# Patient Record
Sex: FEMALE | Race: WHITE | NOT HISPANIC OR LATINO | Employment: FULL TIME | URBAN - METROPOLITAN AREA
[De-identification: names, ages, dates, MRNs, and addresses within clinical notes are randomized per-mention and may not be internally consistent; named-entity substitution may affect disease eponyms.]

---

## 2017-05-11 ENCOUNTER — OFFICE VISIT (OUTPATIENT)
Dept: INTERNAL MEDICINE | Facility: CLINIC | Age: 37
End: 2017-05-11
Payer: COMMERCIAL

## 2017-05-11 ENCOUNTER — LAB VISIT (OUTPATIENT)
Dept: LAB | Facility: HOSPITAL | Age: 37
End: 2017-05-11
Attending: INTERNAL MEDICINE
Payer: COMMERCIAL

## 2017-05-11 VITALS
OXYGEN SATURATION: 98 % | HEIGHT: 62 IN | BODY MASS INDEX: 37.48 KG/M2 | WEIGHT: 203.69 LBS | SYSTOLIC BLOOD PRESSURE: 114 MMHG | DIASTOLIC BLOOD PRESSURE: 80 MMHG | HEART RATE: 81 BPM | TEMPERATURE: 99 F

## 2017-05-11 DIAGNOSIS — Z00.00 ROUTINE GENERAL MEDICAL EXAMINATION AT A HEALTH CARE FACILITY: ICD-10-CM

## 2017-05-11 DIAGNOSIS — J01.00 ACUTE NON-RECURRENT MAXILLARY SINUSITIS: ICD-10-CM

## 2017-05-11 DIAGNOSIS — F32.A DEPRESSION, UNSPECIFIED DEPRESSION TYPE: ICD-10-CM

## 2017-05-11 DIAGNOSIS — M32.9 SYSTEMIC LUPUS ERYTHEMATOSUS, UNSPECIFIED SLE TYPE, UNSPECIFIED ORGAN INVOLVEMENT STATUS: ICD-10-CM

## 2017-05-11 DIAGNOSIS — D56.3 THALASSEMIA MINOR: ICD-10-CM

## 2017-05-11 DIAGNOSIS — Z00.00 ROUTINE GENERAL MEDICAL EXAMINATION AT A HEALTH CARE FACILITY: Primary | ICD-10-CM

## 2017-05-11 LAB
ALBUMIN SERPL BCP-MCNC: 4.2 G/DL
ALP SERPL-CCNC: 77 U/L
ALT SERPL W/O P-5'-P-CCNC: 33 U/L
ANION GAP SERPL CALC-SCNC: 10 MMOL/L
ANISOCYTOSIS BLD QL SMEAR: SLIGHT
AST SERPL-CCNC: 22 U/L
BASOPHILS # BLD AUTO: 0.03 K/UL
BASOPHILS NFR BLD: 0.4 %
BILIRUB SERPL-MCNC: 0.6 MG/DL
BUN SERPL-MCNC: 14 MG/DL
CALCIUM SERPL-MCNC: 9.2 MG/DL
CHLORIDE SERPL-SCNC: 102 MMOL/L
CHOLEST/HDLC SERPL: 4 {RATIO}
CO2 SERPL-SCNC: 26 MMOL/L
CREAT SERPL-MCNC: 0.8 MG/DL
DIFFERENTIAL METHOD: ABNORMAL
EOSINOPHIL # BLD AUTO: 0.3 K/UL
EOSINOPHIL NFR BLD: 4 %
ERYTHROCYTE [DISTWIDTH] IN BLOOD BY AUTOMATED COUNT: 15.4 %
EST. GFR  (AFRICAN AMERICAN): >60 ML/MIN/1.73 M^2
EST. GFR  (NON AFRICAN AMERICAN): >60 ML/MIN/1.73 M^2
GLUCOSE SERPL-MCNC: 73 MG/DL
HCT VFR BLD AUTO: 30.4 %
HDL/CHOLESTEROL RATIO: 25.2 %
HDLC SERPL-MCNC: 139 MG/DL
HDLC SERPL-MCNC: 35 MG/DL
HGB BLD-MCNC: 9.6 G/DL
HYPOCHROMIA BLD QL SMEAR: ABNORMAL
LDLC SERPL CALC-MCNC: 87.4 MG/DL
LYMPHOCYTES # BLD AUTO: 2.6 K/UL
LYMPHOCYTES NFR BLD: 32.7 %
MCH RBC QN AUTO: 18.2 PG
MCHC RBC AUTO-ENTMCNC: 31.6 %
MCV RBC AUTO: 58 FL
MONOCYTES # BLD AUTO: 0.6 K/UL
MONOCYTES NFR BLD: 7.9 %
NEUTROPHILS # BLD AUTO: 4.3 K/UL
NEUTROPHILS NFR BLD: 55 %
NONHDLC SERPL-MCNC: 104 MG/DL
OVALOCYTES BLD QL SMEAR: ABNORMAL
PLATELET # BLD AUTO: 314 K/UL
PLATELET BLD QL SMEAR: ABNORMAL
PMV BLD AUTO: ABNORMAL FL
POIKILOCYTOSIS BLD QL SMEAR: SLIGHT
POLYCHROMASIA BLD QL SMEAR: ABNORMAL
POTASSIUM SERPL-SCNC: 3.9 MMOL/L
PROT SERPL-MCNC: 7.6 G/DL
RBC # BLD AUTO: 5.27 M/UL
SCHISTOCYTES BLD QL SMEAR: PRESENT
SODIUM SERPL-SCNC: 138 MMOL/L
SPHEROCYTES BLD QL SMEAR: ABNORMAL
TRIGL SERPL-MCNC: 83 MG/DL
TSH SERPL DL<=0.005 MIU/L-ACNC: 1.86 UIU/ML
WBC # BLD AUTO: 7.82 K/UL

## 2017-05-11 PROCEDURE — 84443 ASSAY THYROID STIM HORMONE: CPT

## 2017-05-11 PROCEDURE — 96372 THER/PROPH/DIAG INJ SC/IM: CPT | Mod: S$GLB,,, | Performed by: INTERNAL MEDICINE

## 2017-05-11 PROCEDURE — 99385 PREV VISIT NEW AGE 18-39: CPT | Mod: 25,S$GLB,, | Performed by: INTERNAL MEDICINE

## 2017-05-11 PROCEDURE — 85025 COMPLETE CBC W/AUTO DIFF WBC: CPT

## 2017-05-11 PROCEDURE — 36415 COLL VENOUS BLD VENIPUNCTURE: CPT | Mod: PO

## 2017-05-11 PROCEDURE — 99999 PR PBB SHADOW E&M-NEW PATIENT-LVL III: CPT | Mod: PBBFAC,,, | Performed by: INTERNAL MEDICINE

## 2017-05-11 PROCEDURE — 80061 LIPID PANEL: CPT

## 2017-05-11 PROCEDURE — 80053 COMPREHEN METABOLIC PANEL: CPT

## 2017-05-11 RX ORDER — DULOXETIN HYDROCHLORIDE 60 MG/1
60 CAPSULE, DELAYED RELEASE ORAL DAILY
Qty: 30 CAPSULE | Refills: 11 | Status: SHIPPED | OUTPATIENT
Start: 2017-05-11 | End: 2017-10-31

## 2017-05-11 RX ORDER — HYDROXYCHLOROQUINE SULFATE 200 MG/1
200 TABLET, FILM COATED ORAL DAILY
COMMUNITY
End: 2017-10-31

## 2017-05-11 RX ORDER — DOXYCYCLINE 100 MG/1
100 CAPSULE ORAL 2 TIMES DAILY
Qty: 20 CAPSULE | Refills: 0 | Status: SHIPPED | OUTPATIENT
Start: 2017-05-11 | End: 2017-08-28

## 2017-05-11 RX ORDER — DULOXETIN HYDROCHLORIDE 60 MG/1
60 CAPSULE, DELAYED RELEASE ORAL DAILY
COMMUNITY
End: 2017-05-11 | Stop reason: SDUPTHER

## 2017-05-11 RX ORDER — MULTIVITAMIN
1 TABLET ORAL DAILY
COMMUNITY

## 2017-05-11 RX ORDER — METHYLPREDNISOLONE ACETATE 80 MG/ML
80 INJECTION, SUSPENSION INTRA-ARTICULAR; INTRALESIONAL; INTRAMUSCULAR; SOFT TISSUE
Status: COMPLETED | OUTPATIENT
Start: 2017-05-11 | End: 2017-05-11

## 2017-05-11 RX ADMIN — METHYLPREDNISOLONE ACETATE 80 MG: 80 INJECTION, SUSPENSION INTRA-ARTICULAR; INTRALESIONAL; INTRAMUSCULAR; SOFT TISSUE at 08:05

## 2017-05-11 NOTE — MR AVS SNAPSHOT
[place] [facility]  [address] [facility] [id number]  Phone: [phone]                  Eugenia Sultana   2017 7:40 AM   Office Visit    Description:  Female : 1980   Provider:  Eriberto Pedraza MD   Department:  [facility]           Reason for Visit     Establish Care     Sinusitis           Diagnoses this Visit        Comments    Routine general medical examination at a health care facility    -  Primary     Systemic lupus erythematosus, unspecified SLE type, unspecified organ involvement status         Thalassemia minor                To Do List           Future Appointments        Provider Department Dept Phone    2017 10:50 AM LABORATORY, [facility] - Laboratory [phone]      Goals (5 Years of Data)     None       These Medications        Disp Refills Start End    doxycycline (VIBRAMYCIN) 100 MG Cap 20 capsule 0 2017     Take 1 capsule (100 mg total) by mouth 2 (two) times daily. - Oral    Pharmacy: [facility]/pharmacy #[id number] [facility] [address] [facility] Ph #: [phone]       duloxetine (CYMBALTA) 60 MG capsule 30 capsule 11 2017     Take 1 capsule (60 mg total) by mouth once daily. - Oral    Pharmacy: [facility]/pharmacy #[id number] [facility] [address] [address] [facility] Ph #: [phone]         [facility]     [facility] - 24/ Assistance  Unless otherwise directed by your provider, please contact [facility], our nurse care line that is available for 24/7 assistance.     Registered nurses in the [facility] provide: appointment scheduling, clinical advisement, health education, and other advisory services.  Call: [phone] (toll free)               Medications           Message regarding Medications     Verify the changes and/or additions to your medication regime listed below are the same as discussed with your clinician today.  If any of these changes or additions are incorrect,  "please notify your healthcare provider.        START taking these NEW medications        Refills    doxycycline (VIBRAMYCIN) 100 MG Cap 0    Sig: Take 1 capsule (100 mg total) by mouth 2 (two) times daily.    Class: Normal    Route: Oral    duloxetine (CYMBALTA) 60 MG capsule 11    Sig: Take 1 capsule (60 mg total) by mouth once daily.    Class: Normal    Route: Oral      These medications were administered today        Dose Freq    methylPREDNISolone acetate injection 80 mg 80 mg Clinic/HOD 1 time    Sig: Inject 1 mL (80 mg total) into the muscle one time.    Class: Normal    Route: Intramuscular           Verify that the below list of medications is an accurate representation of the medications you are currently taking.  If none reported, the list may be blank. If incorrect, please contact your healthcare provider. Carry this list with you in case of emergency.           Current Medications     duloxetine (CYMBALTA) 60 MG capsule Take 1 capsule (60 mg total) by mouth once daily.    hydroxychloroquine (PLAQUENIL) 200 mg tablet Take 200 mg by mouth once daily.    multivitamin (ONE DAILY MULTIVITAMIN) per tablet Take 1 tablet by mouth once daily.    doxycycline (VIBRAMYCIN) 100 MG Cap Take 1 capsule (100 mg total) by mouth 2 (two) times daily.           Clinical Reference Information           Your Vitals Were     BP Pulse Temp Height Weight SpO2    114/80 81 98.9 °F (37.2 °C) (Tympanic) 5' 2" (1.575 m) 92.4 kg (203 lb 11.3 oz) 98%    BMI                37.26 kg/m2          Blood Pressure          Most Recent Value    BP  114/80      Allergies as of 5/11/2017     Augmentin [Amoxicillin-pot Clavulanate]    Cinnamon Analogues      Immunizations Administered on Date of Encounter - 5/11/2017     None      Orders Placed During Today's Visit     Future Labs/Procedures Expected by Expires    CBC auto differential  5/11/2017 7/10/2018    Comprehensive metabolic panel  5/11/2017 7/10/2018    Lipid panel  5/11/2017 7/10/2018 "    TSH  5/11/2017 7/10/2018      Administrations This Visit     methylPREDNISolone acetate injection 80 mg     Admin Date Action Dose Route Administered By             05/11/2017 Given 80 mg Intramuscular Nancy Orellana LPN                      [facility] Sign-Up     Activating your [facility] account is as easy as 1-2-3!     1) Visit [email or web address], select Sign Up Now, enter this activation code and your date of birth, then select Next.  [email or web address]  Expires: 6/24/2017 12:51 PM      2) Create a username and password to use when you visit [facility] in the future and select a security question in case you lose your password and select Next.    3) Enter your e-mail address and click Sign Up!    Additional Information  If you have questions, please e-mail [email or web address] or call [phone] to talk to our [facility] staff. Remember, [facility] is NOT to be used for urgent needs. For medical emergencies, dial 911.         Smoking Cessation     If you would like to quit smoking:   You may be eligible for free services if you are a [place] resident and started smoking cigarettes before September 1, 1988.  Call the Smoking Cessation Trust ([facility]) toll free at [phone] or [phone].   Call 4-215-QUIT-NOW if you do not meet the above criteria.   Contact us via email: [email or web address]   View our website for more information: [email or web address]        Language Assistance Services     ATTENTION: Language assistance services are available, free of charge. Please call [phone].      ATENCIÓN: Si habla español, tiene a hodgson disposición servicios gratuitos de asistencia lingüística. Llame al [phone].     CHÚ Ý: N?u b?n nói Ti?ng Vi?t, có các d?ch v? h? tr? ngôn ng? mi?n phí dành cho b?n. G?i s? [phone].         [facility] [facility] complies with applicable Federal civil rights laws and does not discriminate on the basis of race, color, national origin, age,  disability, or sex.

## 2017-05-11 NOTE — PROGRESS NOTES
"HPI:  Patient is a 36-year-old female who comes in today for initial visit myself to establish care.  Patient at this time has problems with sinusitis.  She has 1-2 episodes per year.  Currently she's been having problems for about a week.  She complains a lot of maxillary and frontal sinus tenderness.  She has yellow greenish discharge.  She has a lot of pressure in ears as well.  Otherwise, she is been doing fine.  She has a history of lupus and has been on Plaquenil for about one year.  That is controlled most of her symptoms.  She has a long history of depression, having been on medications for 15-16 years    Current MEDS: medcard review, verified and update  Allergies: Per the electronic medical record    Past Medical History:   Diagnosis Date    Depression     SLE (systemic lupus erythematosus) 2016    Thalassemia minor     not sure if alpha/beta       Past Surgical History:   Procedure Laterality Date    TONSILLECTOMY         SHx: per the electronic medical record    FHx: recorded in the electronic medical record    ROS:    denies any chest pains or shortness of breath. Denies any nausea, vomiting or diarrhea. Denies any fever, chills or sweats. Denies any change in weight, voice, stool, skin or hair. Denies any dysuria, dyspepsia or dysphagia. Denies any change in vision, hearing or headaches. Denies any swollen lymph nodes or loss of memory.    PE:  /80  Pulse 81  Temp 98.9 °F (37.2 °C) (Tympanic)   Ht 5' 2" (1.575 m)  Wt 92.4 kg (203 lb 11.3 oz)  SpO2 98%  BMI 37.26 kg/m2  Gen: Well-developed, well-nourished, female, in no acute distress, oriented x3  HEENT: neck is supple, no adenopathy, carotids 2+ equal without bruits, thyroid exam normal size without nodules.  Nasal mucosa hyperemic and edematous.  TMs are normal.  She has both frontal and maxillary sinus tenderness to palpation  CHEST: clear to auscultation and percussion  CVS: regular rate and rhythm without significant murmur, " gallop, or rubs  ABD: soft, benign, no rebound no guarding, no distention. Bowel sounds are normal.     Nontender,  no palpable masses, no organomegaly and no audible bruits.  BREAST: no masses.  No nipple inversion, retraction, or deviation.  EXT: no clubbing, cyanosis, or edema  LYMPH: no cervical, inguinal, or axillary adenopathy  FEET: no loss of sensation.  No ulcers or pressure sores.  NEURO: gait normal.  Cranial nerves II- XII intact. No nystagmus.  Speech normal.   Gross motor and sensory unremarkable.  PELVIC: deferred        Impression:  Acute sinusitis  Other problems below, stable  Patient Active Problem List   Diagnosis    SLE (systemic lupus erythematosus)    Thalassemia minor    Depression       Plan:   Orders Placed This Encounter    CBC auto differential    Comprehensive metabolic panel    Lipid panel    TSH    doxycycline (VIBRAMYCIN) 100 MG Cap    duloxetine (CYMBALTA) 60 MG capsule    methylPREDNISolone acetate injection 80 mg     She was given Depo-Medrol 80×1.  She was started on doxycycline 10 days.  She will take over-the-counter Allegra-D and Mucinex.  She'll have the above lab work done.  We will call her with results.

## 2017-08-28 ENCOUNTER — OFFICE VISIT (OUTPATIENT)
Dept: INTERNAL MEDICINE | Facility: CLINIC | Age: 37
End: 2017-08-28
Payer: OTHER GOVERNMENT

## 2017-08-28 VITALS
BODY MASS INDEX: 35.94 KG/M2 | SYSTOLIC BLOOD PRESSURE: 134 MMHG | DIASTOLIC BLOOD PRESSURE: 80 MMHG | HEIGHT: 62 IN | OXYGEN SATURATION: 98 % | WEIGHT: 195.31 LBS | HEART RATE: 83 BPM | TEMPERATURE: 98 F

## 2017-08-28 DIAGNOSIS — R50.9 FEVER, UNSPECIFIED FEVER CAUSE: Primary | ICD-10-CM

## 2017-08-28 PROCEDURE — 99999 PR PBB SHADOW E&M-EST. PATIENT-LVL III: CPT | Mod: PBBFAC,,, | Performed by: NURSE PRACTITIONER

## 2017-08-28 PROCEDURE — 3008F BODY MASS INDEX DOCD: CPT | Mod: ,,, | Performed by: NURSE PRACTITIONER

## 2017-08-28 PROCEDURE — 99213 OFFICE O/P EST LOW 20 MIN: CPT | Mod: PBBFAC,PO | Performed by: NURSE PRACTITIONER

## 2017-08-28 PROCEDURE — 99213 OFFICE O/P EST LOW 20 MIN: CPT | Mod: S$PBB,,, | Performed by: NURSE PRACTITIONER

## 2017-08-28 NOTE — PROGRESS NOTES
"Subjective:      Patient ID: Eugenia Tobin is a 36 y.o. female.    Chief Complaint: Fever    HPI:   Patient states for several weeks now she says she has had a low grade temp of 99.  Today she had a temp of 102.2 this am.  She says she feels ok, denies cough, congestion, sob, urinary problems, body aches, or joint pain.  She says she is scheduled to see her rheumatologist today.  She has seen Dr Churchill before, thinks she will have labs done, she says they are working her up for lupus or some other disease.      Past Medical History:   Diagnosis Date    Depression     SLE (systemic lupus erythematosus) 2016    Thalassemia minor     not sure if alpha/beta       Past Surgical History:   Procedure Laterality Date    TONSILLECTOMY         Lab Results   Component Value Date    WBC 7.82 05/11/2017    HGB 9.6 (L) 05/11/2017    HCT 30.4 (L) 05/11/2017     05/11/2017    CHOL 139 05/11/2017    TRIG 83 05/11/2017    HDL 35 (L) 05/11/2017    ALT 33 05/11/2017    AST 22 05/11/2017     05/11/2017    K 3.9 05/11/2017     05/11/2017    CREATININE 0.8 05/11/2017    BUN 14 05/11/2017    CO2 26 05/11/2017    TSH 1.864 05/11/2017       /80   Pulse 83   Temp 98.4 °F (36.9 °C) (Tympanic)   Ht 5' 2" (1.575 m)   Wt 88.6 kg (195 lb 5.2 oz)   SpO2 98%   BMI 35.73 kg/m²       Review of Systems   Constitutional: Negative for appetite change, fatigue and unexpected weight change.   HENT: Negative for congestion, ear pain, postnasal drip, rhinorrhea, sinus pressure, sneezing, sore throat, tinnitus, trouble swallowing and voice change.    Eyes: Negative for pain, discharge, redness, itching and visual disturbance.   Respiratory: Negative for cough, chest tightness, shortness of breath and wheezing.    Cardiovascular: Negative for chest pain, palpitations and leg swelling.   Gastrointestinal: Negative for abdominal distention, abdominal pain, blood in stool, constipation, diarrhea, nausea and vomiting.     "    No reflux.   Genitourinary: Negative for difficulty urinating, dyspareunia, flank pain, menstrual problem and pelvic pain.   Musculoskeletal: Negative for arthralgias, back pain, myalgias and neck stiffness.   Skin: Negative for color change and rash.   Neurological: Negative for dizziness and headaches.   Psychiatric/Behavioral: Negative for confusion and sleep disturbance. The patient is not nervous/anxious.       Objective:     Physical Exam   Constitutional: She is oriented to person, place, and time. She appears well-developed and well-nourished. No distress.   Musculoskeletal:   Normal gait   Neurological: She is alert and oriented to person, place, and time.   Skin: Skin is warm and dry.   Psychiatric: She has a normal mood and affect. Her behavior is normal.     Assessment:      1. Fever, unspecified fever cause      Plan:   Fever, unspecified fever cause    She will see her rheumatologist and send the reports to her pcp, dr Pedraza.        Current Outpatient Prescriptions:     duloxetine (CYMBALTA) 60 MG capsule, Take 1 capsule (60 mg total) by mouth once daily., Disp: 30 capsule, Rfl: 11    hydroxychloroquine (PLAQUENIL) 200 mg tablet, Take 200 mg by mouth once daily., Disp: , Rfl:     multivitamin (ONE DAILY MULTIVITAMIN) per tablet, Take 1 tablet by mouth once daily., Disp: , Rfl:

## 2017-10-31 ENCOUNTER — OFFICE VISIT (OUTPATIENT)
Dept: INTERNAL MEDICINE | Facility: CLINIC | Age: 37
End: 2017-10-31
Payer: OTHER GOVERNMENT

## 2017-10-31 ENCOUNTER — TELEPHONE (OUTPATIENT)
Dept: INTERNAL MEDICINE | Facility: CLINIC | Age: 37
End: 2017-10-31

## 2017-10-31 VITALS
TEMPERATURE: 98 F | HEIGHT: 62 IN | BODY MASS INDEX: 36.63 KG/M2 | HEART RATE: 86 BPM | OXYGEN SATURATION: 99 % | DIASTOLIC BLOOD PRESSURE: 84 MMHG | SYSTOLIC BLOOD PRESSURE: 122 MMHG | WEIGHT: 199.06 LBS

## 2017-10-31 DIAGNOSIS — M25.50 ARTHRALGIA, UNSPECIFIED JOINT: ICD-10-CM

## 2017-10-31 DIAGNOSIS — L93.0 LUPUS ERYTHEMATOSUS, UNSPECIFIED FORM: Primary | ICD-10-CM

## 2017-10-31 DIAGNOSIS — R76.8 POSITIVE ANA (ANTINUCLEAR ANTIBODY): ICD-10-CM

## 2017-10-31 PROCEDURE — 99999 PR PBB SHADOW E&M-EST. PATIENT-LVL IV: CPT | Mod: PBBFAC,,, | Performed by: NURSE PRACTITIONER

## 2017-10-31 PROCEDURE — 99213 OFFICE O/P EST LOW 20 MIN: CPT | Mod: S$PBB,,, | Performed by: NURSE PRACTITIONER

## 2017-10-31 PROCEDURE — 99214 OFFICE O/P EST MOD 30 MIN: CPT | Mod: PBBFAC,PO | Performed by: NURSE PRACTITIONER

## 2017-10-31 RX ORDER — DULOXETIN HYDROCHLORIDE 60 MG/1
60 CAPSULE, DELAYED RELEASE ORAL DAILY
COMMUNITY
End: 2019-09-23

## 2017-10-31 NOTE — PROGRESS NOTES
"Subjective:      Patient ID: Eugenia Tobin is a 37 y.o. female.    Chief Complaint: pain in joints    HPI:  Patient states she is having a lot of problems with joint pain.  She says all of her joints are hurting in her body.  She sees Dr Lr, a rheumatologist who is treating her for fibromyalgia, saw him last week. She has been taken off of her Plaquenil and steroids.  She says she had been prescribed 5 mg of prednisone daily for a month, she is not taking it.  Says he wants to do a sleep study, she is not interested in that.  She is looking for a referral to another rheumatolotist    Past Medical History:   Diagnosis Date    Depression     SLE (systemic lupus erythematosus) 2016    Thalassemia minor     not sure if alpha/beta       Past Surgical History:   Procedure Laterality Date    TONSILLECTOMY         Lab Results   Component Value Date    WBC 7.82 05/11/2017    HGB 9.6 (L) 05/11/2017    HCT 30.4 (L) 05/11/2017     05/11/2017    CHOL 139 05/11/2017    TRIG 83 05/11/2017    HDL 35 (L) 05/11/2017    ALT 33 05/11/2017    AST 22 05/11/2017     05/11/2017    K 3.9 05/11/2017     05/11/2017    CREATININE 0.8 05/11/2017    BUN 14 05/11/2017    CO2 26 05/11/2017    TSH 1.864 05/11/2017       /84 (BP Location: Right arm)   Pulse 86   Temp 98.1 °F (36.7 °C) (Tympanic)   Ht 5' 2" (1.575 m)   Wt 90.3 kg (199 lb 1.2 oz)   SpO2 99%   BMI 36.41 kg/m²       Review of Systems   Constitutional: Negative for appetite change, fatigue and unexpected weight change.   HENT: Negative for congestion, ear pain, postnasal drip, rhinorrhea, sinus pressure, sneezing, sore throat, tinnitus, trouble swallowing and voice change.    Eyes: Negative for pain, discharge, redness, itching and visual disturbance.   Respiratory: Negative for cough, chest tightness, shortness of breath and wheezing.    Cardiovascular: Negative for chest pain, palpitations and leg swelling.   Gastrointestinal: Negative for " abdominal distention, abdominal pain, blood in stool, constipation, diarrhea, nausea and vomiting.        No reflux.   Genitourinary: Negative for difficulty urinating, dyspareunia, flank pain, menstrual problem and pelvic pain.   Musculoskeletal: Positive for arthralgias. Negative for back pain, myalgias and neck stiffness.   Skin: Negative for color change and rash.   Neurological: Negative for dizziness and headaches.   Psychiatric/Behavioral: Negative for confusion and sleep disturbance. The patient is not nervous/anxious.       Objective:     Physical Exam   Constitutional: She is oriented to person, place, and time. She appears well-developed and well-nourished. No distress.   Musculoskeletal:   Normal gait   Neurological: She is alert and oriented to person, place, and time.   Skin: Skin is warm and dry.   Psychiatric: She has a normal mood and affect. Her behavior is normal.     Assessment:      1. Lupus erythematosus, unspecified form    2. Arthralgia, unspecified joint    3. Positive GE (antinuclear antibody)      Plan:   Lupus erythematosus, unspecified form  -     Cancel: Ambulatory Referral to Rheumatology  -     Ambulatory Referral to Rheumatology    Arthralgia, unspecified joint  -     Cancel: Ambulatory Referral to Rheumatology  -     Ambulatory Referral to Rheumatology    Positive GE (antinuclear antibody)  -     Ambulatory Referral to Rheumatology    will check labs.  May be a while before she can be seen with our rheumatology dept.  Monitoring for the results.  Needs to see her current one in the mean time for any issues      Current Outpatient Prescriptions:     DULoxetine (CYMBALTA) 60 MG capsule, Take 60 mg by mouth once daily., Disp: , Rfl:     multivitamin (ONE DAILY MULTIVITAMIN) per tablet, Take 1 tablet by mouth once daily., Disp: , Rfl:

## 2017-10-31 NOTE — TELEPHONE ENCOUNTER
Please call her to let her know I discussed her case with dr fowler, her PCP.   I need her to come in for labs.  We need to see if her inflammatory markers are elevated

## 2017-11-01 ENCOUNTER — LAB VISIT (OUTPATIENT)
Dept: LAB | Facility: HOSPITAL | Age: 37
End: 2017-11-01
Attending: INTERNAL MEDICINE
Payer: OTHER GOVERNMENT

## 2017-11-01 ENCOUNTER — TELEPHONE (OUTPATIENT)
Dept: RHEUMATOLOGY | Facility: CLINIC | Age: 37
End: 2017-11-01

## 2017-11-01 DIAGNOSIS — L93.0 LUPUS ERYTHEMATOSUS, UNSPECIFIED FORM: ICD-10-CM

## 2017-11-01 LAB
CRP SERPL-MCNC: 1 MG/L
ERYTHROCYTE [SEDIMENTATION RATE] IN BLOOD BY WESTERGREN METHOD: 4 MM/HR

## 2017-11-01 PROCEDURE — 86140 C-REACTIVE PROTEIN: CPT

## 2017-11-01 PROCEDURE — 85651 RBC SED RATE NONAUTOMATED: CPT

## 2017-11-01 PROCEDURE — 86038 ANTINUCLEAR ANTIBODIES: CPT

## 2017-11-01 PROCEDURE — 36415 COLL VENOUS BLD VENIPUNCTURE: CPT | Mod: PO

## 2017-11-02 ENCOUNTER — TELEPHONE (OUTPATIENT)
Dept: INTERNAL MEDICINE | Facility: CLINIC | Age: 37
End: 2017-11-02

## 2017-11-02 DIAGNOSIS — M79.7 FIBROMYALGIA: Primary | ICD-10-CM

## 2017-11-02 LAB — ANA SER QL IF: NORMAL

## 2017-11-02 NOTE — TELEPHONE ENCOUNTER
Called pt informed her of recommendations.  She voiced understanding and has an appointment scheduled.  She reports that she is not taking Neurontin.  She is taking 20 mg of Prednisone.

## 2017-11-02 NOTE — TELEPHONE ENCOUNTER
Please let her know her labs are not showing any urgency to see our rheumatologist.  We can schedule her to see one of our rheumatologist next available, their is a waiting list.  Please see your current rheumatologist while waiting    The referral is in       Please note from Care everywhere from Dr Pittman's notes she is supposed to be on 5 mg of Prednisone daily, and Neruontin at night.  She can call his office to clarify this.    thanks

## 2017-11-03 ENCOUNTER — TELEPHONE (OUTPATIENT)
Dept: INTERNAL MEDICINE | Facility: CLINIC | Age: 37
End: 2017-11-03

## 2017-11-03 NOTE — TELEPHONE ENCOUNTER
Spoke to her and discussed her lab results.  We discussed Dr Pittman's note and recommendations.  She may try the Neurontin nightly again, 100 mg and slowly wean up as discussed.  Will see our rheumatolgist in 2 mo

## 2018-02-26 ENCOUNTER — TELEPHONE (OUTPATIENT)
Dept: RHEUMATOLOGY | Facility: CLINIC | Age: 38
End: 2018-02-26

## 2018-02-26 NOTE — TELEPHONE ENCOUNTER
----- Message from Tahira Morataya sent at 2/26/2018 11:51 AM CST -----  Contact: Self  Pt is calling to schedule an appt to see Dr. Gracia for a 2nd opinion for Lupus to try and find out if she has it or not.   was unable to make the appt due to an exhausted template.    She can be reached at [phone].    Thank you.

## 2018-02-26 NOTE — TELEPHONE ENCOUNTER
Spoke with Ms. Tobin and scheduled a new patient appointment with Dr. Gracia for 02/28/2018 at 8:30am for Lupus second opinion.

## 2018-02-28 ENCOUNTER — OFFICE VISIT (OUTPATIENT)
Dept: RHEUMATOLOGY | Facility: CLINIC | Age: 38
End: 2018-02-28
Payer: OTHER GOVERNMENT

## 2018-02-28 ENCOUNTER — HOSPITAL ENCOUNTER (OUTPATIENT)
Dept: RADIOLOGY | Facility: HOSPITAL | Age: 38
Discharge: HOME | End: 2018-02-28
Attending: INTERNAL MEDICINE
Payer: OTHER GOVERNMENT

## 2018-02-28 VITALS
HEART RATE: 100 BPM | HEIGHT: 62 IN | SYSTOLIC BLOOD PRESSURE: 142 MMHG | DIASTOLIC BLOOD PRESSURE: 82 MMHG | WEIGHT: 200.38 LBS | BODY MASS INDEX: 36.87 KG/M2

## 2018-02-28 DIAGNOSIS — M79.7 FIBROMYALGIA: Primary | ICD-10-CM

## 2018-02-28 PROCEDURE — 99214 OFFICE O/P EST MOD 30 MIN: CPT | Mod: PBBFAC,25,PO | Performed by: INTERNAL MEDICINE

## 2018-02-28 PROCEDURE — 73130 X-RAY EXAM OF HAND: CPT | Mod: 26,50,, | Performed by: RADIOLOGY

## 2018-02-28 PROCEDURE — 73130 X-RAY EXAM OF HAND: CPT | Mod: 50,TC,FY,PO

## 2018-02-28 PROCEDURE — 99204 OFFICE O/P NEW MOD 45 MIN: CPT | Mod: S$PBB,,, | Performed by: INTERNAL MEDICINE

## 2018-02-28 PROCEDURE — 99999 PR PBB SHADOW E&M-EST. PATIENT-LVL IV: CPT | Mod: PBBFAC,,, | Performed by: INTERNAL MEDICINE

## 2018-02-28 RX ORDER — MULTIVITAMIN
TABLET ORAL
COMMUNITY
End: 2018-02-28 | Stop reason: SDUPTHER

## 2018-02-28 RX ORDER — GABAPENTIN 100 MG/1
200 CAPSULE ORAL DAILY PRN
COMMUNITY
End: 2018-03-08 | Stop reason: ALTCHOICE

## 2018-02-28 RX ORDER — DULOXETIN HYDROCHLORIDE 60 MG/1
60 CAPSULE, DELAYED RELEASE ORAL DAILY
COMMUNITY
Start: 2017-11-17 | End: 2019-09-23

## 2018-02-28 NOTE — PROGRESS NOTES
CC:  Chief Complaint   Patient presents with    Positive GE       History of Present Illness:  Eugenia Berry a 37 y.o.yo female   Patient Active Problem List   Diagnosis    SLE (systemic lupus erythematosus)    Thalassemia minor    Depression    Fibromyalgia     Presents for new patient evaluation for + GE in the past and second opinion to r/o lupus   In 2016 she had a borderline + ve GE , with neg panel and - ve RF   She was seen by Dr Pineda who treated her for lupus after extensive evaluation was negative for any CTD   She presents for a second opinion .  She c/o malar redness , + PS , no other rashes , + chronic fatigue   Denies dry eyes , mouth , oral ulcers   She has had chronic  arthralgias for many years , inv hands , knees , hips   Feels hands swell up and she also notices some tingling intermittently   With weak hand    She has no specific pattern of early am stiffness , some times pain / stiffness all day   Ibuprofen helps some   No RP   In the past she has tried plaquenil x 5 months without much help and so stopped  Prednisone 20 mg gave her symptomatic relief   She takes cymbalta 60 for depression   She takes gabapentin 200 mg qhs prn for restless legs   + smoking   + f/h of lupus in sister who      Past Medical History:   Diagnosis Date    Depression     SLE (systemic lupus erythematosus) 2016    Thalassemia minor     not sure if alpha/beta       Past Surgical History:   Procedure Laterality Date    TONSILLECTOMY           Social History   Substance Use Topics    Smoking status: Current Every Day Smoker    Smokeless tobacco: Not on file    Alcohol use No       Family History   Problem Relation Age of Onset    Diabetes Mother     Diabetes Father     Lupus Sister     Alzheimer's disease Maternal Grandmother     Thalassemia Paternal Grandfather        Review of patient's allergies indicates:   Allergen Reactions    Augmentin [amoxicillin-pot clavulanate]      Cinnamon analogues              Review of Systems:  Constitutional: Denies fever, chills. No recent weight changes.   Fatigue: yes  Muscle weakness: no  Headaches: no new headaches  Eyes: No redness or dryness.  No recent visual changes.  ENT: Denies dry mouth. No oral or nasal ulcers.  Card: No chest pain.  Resp: No cough or sob.   Gastro: No nausea or vomiting.  No heartburn.  Constipation: no  Diarrhea: no  Genito:  No dysuria.  No genital ulcers.  Skin: No rash.  Raynauds:no  Neuro: per HPI   Psych: + depression  Endo:  no excess thirst.  Heme: no abnormal bleeding or bruising  Clots:none   Miscarriages : none     OBJECTIVE:     Vital Signs   Vitals:    02/28/18 0832   BP: (!) 142/82   Pulse: 100     Physical Exam:  General Appearance:  NAD.   Gait: not favoring.  HEENT: PERRL.  Eyes not dry or injected.  No nasal ulcers.  Mouth not dry, no oral lesions.  Lymph: cervical, supraclavicular or axillary nodes: none abnormal   Cardio: no irregularity of S1 or S2.  No gallops or rubs.   Resp: Normal respiratory motion. Clear to auscultation bilaterally.   No abnormal chest conformation.  Abd: Soft, non-tender, nondistended.  No masses.   Skin: Head and neck,  and extremities examined. No rashes.   Neuro: Ox3.   Cranial nerves II-XII grossly intact.   Sensation intact  in both distal LE and upper extremities to light touch.  Musculoskeletal Exam:  She has tenderness to palpation of anterior  chest wall, all PIP/ MCP     Right Side Rheumatological Exam     Examination finds the shoulder, elbow, wrist, knee, 1st PIP, 1st MCP, 2nd PIP, 2nd MCP, 3rd PIP, 3rd MCP, 4th PIP, 4th MCP, 5th PIP and 5th MCP no synovitis .    Others :  Hip:N  Ankle :N  Foot:N     Left Side Rheumatological Exam     Examination finds the shoulder, elbow, wrist, knee, 1st PIP, 1st MCP, 2nd PIP, 2nd MCP, 3rd PIP, 3rd MCP, 4th PIP, 4th MCP, 5th PIP and 5th MCP no synovitis     Others :  Hip:N  Ankle :N  Foot:N    Spine :  Occiput to wall :neg    Modified schobers :neg     Tender points:+  Muscle strength:Equal and full in all mm groups of the upper and lower ext.    Laboratory:   Results for orders placed or performed in visit on 11/01/17   C-reactive protein   Result Value Ref Range    CRP 1.0 0.0 - 8.2 mg/L   GE   Result Value Ref Range    GE Screen Negative <1:160 Negative <1:160   Sedimentation rate, manual   Result Value Ref Range    Sed Rate 4 0 - 20 mm/Hr     Imaging :none     Notes reviewed  Other procedures:    ASSESSMENT/PLAN:     Fibromyalgia  -     Cyclic citrul peptide antibody, IgG; Future; Expected date: 02/28/2018  -     X-Ray Hand 3 View Bilateral; Future; Expected date: 02/28/2018  -     Ambulatory Referral to Physical/Occupational Therapy      1: Polyarthralgia with h/o borderline + GE   With negative panel and normal complements   Rf- neg   With rpt GE negative as well as normal ESR/ CRP   Check CCP   Refer PT   I do nor see any evidence of active CTD at this point   Will closely monitor   C/w cymbalta 60 mg daily   neurontin prn   Exercise , weight loss     2: Chronic anemia :  thalasemia minor     6 week return

## 2018-03-05 ENCOUNTER — PATIENT MESSAGE (OUTPATIENT)
Dept: RHEUMATOLOGY | Facility: CLINIC | Age: 38
End: 2018-03-05

## 2018-03-08 ENCOUNTER — PATIENT MESSAGE (OUTPATIENT)
Dept: RHEUMATOLOGY | Facility: CLINIC | Age: 38
End: 2018-03-08

## 2018-03-08 ENCOUNTER — TELEPHONE (OUTPATIENT)
Dept: RHEUMATOLOGY | Facility: CLINIC | Age: 38
End: 2018-03-08

## 2018-03-08 DIAGNOSIS — M79.7 FIBROMYALGIA: Primary | ICD-10-CM

## 2018-03-08 RX ORDER — PREGABALIN 75 MG/1
75 CAPSULE ORAL 2 TIMES DAILY
Qty: 60 CAPSULE | Refills: 3 | Status: SHIPPED | OUTPATIENT
Start: 2018-03-08

## 2018-03-08 NOTE — TELEPHONE ENCOUNTER
lyrica prescribed , please fax the script   Let her know to  and stop gabapentin as advised     Side effects and pregnancy implications discussed with her already

## 2018-04-09 ENCOUNTER — TELEPHONE (OUTPATIENT)
Dept: RHEUMATOLOGY | Facility: CLINIC | Age: 38
End: 2018-04-09

## 2019-01-29 ENCOUNTER — PATIENT MESSAGE (OUTPATIENT)
Dept: ADMINISTRATIVE | Facility: HOSPITAL | Age: 39
End: 2019-01-29

## 2019-09-23 ENCOUNTER — OFFICE VISIT (OUTPATIENT)
Dept: OTOLARYNGOLOGY | Facility: CLINIC | Age: 39
End: 2019-09-23
Payer: OTHER GOVERNMENT

## 2019-09-23 VITALS
TEMPERATURE: 99 F | WEIGHT: 201.94 LBS | DIASTOLIC BLOOD PRESSURE: 73 MMHG | SYSTOLIC BLOOD PRESSURE: 111 MMHG | BODY MASS INDEX: 36.94 KG/M2 | HEART RATE: 76 BPM

## 2019-09-23 DIAGNOSIS — L03.211 FACIAL CELLULITIS: Primary | ICD-10-CM

## 2019-09-23 PROCEDURE — 99203 OFFICE O/P NEW LOW 30 MIN: CPT | Mod: S$PBB,,, | Performed by: ORTHOPAEDIC SURGERY

## 2019-09-23 PROCEDURE — 99999 PR PBB SHADOW E&M-EST. PATIENT-LVL III: ICD-10-PCS | Mod: PBBFAC,,, | Performed by: ORTHOPAEDIC SURGERY

## 2019-09-23 PROCEDURE — 99213 OFFICE O/P EST LOW 20 MIN: CPT | Mod: PBBFAC | Performed by: ORTHOPAEDIC SURGERY

## 2019-09-23 PROCEDURE — 99999 PR PBB SHADOW E&M-EST. PATIENT-LVL III: CPT | Mod: PBBFAC,,, | Performed by: ORTHOPAEDIC SURGERY

## 2019-09-23 PROCEDURE — 99203 PR OFFICE/OUTPT VISIT, NEW, LEVL III, 30-44 MIN: ICD-10-PCS | Mod: S$PBB,,, | Performed by: ORTHOPAEDIC SURGERY

## 2019-09-23 RX ORDER — SULFAMETHOXAZOLE AND TRIMETHOPRIM 800; 160 MG/1; MG/1
1 TABLET ORAL
COMMUNITY

## 2019-09-23 RX ORDER — MUPIROCIN 20 MG/G
OINTMENT TOPICAL 3 TIMES DAILY
COMMUNITY

## 2019-09-23 NOTE — PROGRESS NOTES
Subjective:       Patient ID: Eugenia Tobin is a 38 y.o. female.    Chief Complaint: Other (c/o nasal abscess;noticed Thursday; taking bactroban and Bactrim; swelling to left side; no longer draining)    Patient is a very pleasant 38 year old female here to see me today for evaluation of a left facial abscess.  She says that this first started on Thursday, and has been on Bactrim and topical bactroban since that time.  She then worsened and she was seen in the ER on Friday at [facility] (records not visible in [facility]), and was given one dose of IV antibiotics.  Per the patient, a CT was also done at that time that was negative.  She has not had any active drainage, no culture taken.  She has no history of DM2.  She does feel that her exam today is better than Saturday.    Review of Systems   Constitutional: Positive for fever. Negative for chills, fatigue and unexpected weight change.   HENT: Positive for facial swelling and rhinorrhea. Negative for congestion, dental problem, ear discharge, ear pain, hearing loss, nosebleeds, postnasal drip, sinus pressure, sneezing, sore throat, tinnitus, trouble swallowing and voice change.    Eyes: Negative for redness, itching and visual disturbance.   Respiratory: Negative for cough, choking, shortness of breath and wheezing.    Cardiovascular: Negative for chest pain and palpitations.   Gastrointestinal: Negative for abdominal pain.        No reflux.   Musculoskeletal: Negative for gait problem.   Skin: Negative for rash.   Neurological: Positive for dizziness, light-headedness and headaches.       Objective:      Physical Exam   Constitutional: She is oriented to person, place, and time. She appears well-developed and well-nourished. No distress.   HENT:   Head: Normocephalic and atraumatic.   Right Ear: Tympanic membrane, external ear and ear canal normal.   Left Ear: Tympanic membrane, external ear and ear canal normal.   Nose: Nose normal. No mucosal  edema, rhinorrhea, nasal deformity or septal deviation. No epistaxis. Right sinus exhibits no maxillary sinus tenderness and no frontal sinus tenderness. Left sinus exhibits no maxillary sinus tenderness and no frontal sinus tenderness.   Mouth/Throat: Uvula is midline, oropharynx is clear and moist and mucous membranes are normal. Mucous membranes are not pale and not dry. No dental caries. No oropharyngeal exudate or posterior oropharyngeal erythema.   Edema of left nasal dorsum and nare with some erythema, skin abrasion of left dorsum, no abscess palpated   Eyes: Pupils are equal, round, and reactive to light. Conjunctivae, EOM and lids are normal. Right eye exhibits no chemosis. Left eye exhibits no chemosis. Right conjunctiva is not injected. Left conjunctiva is not injected. No scleral icterus. Right eye exhibits normal extraocular motion and no nystagmus. Left eye exhibits normal extraocular motion and no nystagmus.   Neck: Trachea normal and phonation normal. No tracheal tenderness present. No tracheal deviation present. No thyroid mass and no thyromegaly present.   Cardiovascular: Intact distal pulses.   Pulmonary/Chest: Effort normal. No stridor. No respiratory distress.   Abdominal: She exhibits no distension.   Lymphadenopathy:        Head (right side): No submental, no submandibular, no preauricular, no posterior auricular and no occipital adenopathy present.        Head (left side): No submental, no submandibular, no preauricular, no posterior auricular and no occipital adenopathy present.     She has no cervical adenopathy.   Neurological: She is alert and oriented to person, place, and time. No cranial nerve deficit.   Skin: Skin is warm and dry. No rash noted. No erythema.   Psychiatric: She has a normal mood and affect. Her behavior is normal.       Assessment:       1. Facial cellulitis        Plan:       1.  Facial cellulitis:  Improving with oral Bactrim and one dose of IV antibiotics in ER.  I  would recommend she continue with her current antibiotic regimen, and I would like for her to return to clinic to recheck in one week.  She will call and will be worked in for an urgent appointment for any worsening of her swelling or pain, would then consider adjustment of her antibiotic regimen.

## 2022-02-10 NOTE — TELEPHONE ENCOUNTER
Called patient regarding referral from Schuyler Braga NP, scheduled next appointment for 1.4.18 at 8.30 am with Dr. Gracia. Added to wait list. Pt verbalized understanding. Will mail apt slip.    Cosentyx Counseling:  I discussed with the patient the risks of Cosentyx including but not limited to worsening of Crohn's disease, immunosuppression, allergic reactions and infections.  The patient understands that monitoring is required including a PPD at baseline and must alert us or the primary physician if symptoms of infection or other concerning signs are noted.